# Patient Record
Sex: FEMALE | NOT HISPANIC OR LATINO
[De-identification: names, ages, dates, MRNs, and addresses within clinical notes are randomized per-mention and may not be internally consistent; named-entity substitution may affect disease eponyms.]

---

## 2019-06-18 ENCOUNTER — TELEPHONE (OUTPATIENT)
Dept: SCHEDULING | Age: 39
End: 2019-06-18

## 2019-06-27 PROCEDURE — 87205 SMEAR GRAM STAIN: CPT | Performed by: NURSE PRACTITIONER

## 2019-06-27 PROCEDURE — 87075 CULTR BACTERIA EXCEPT BLOOD: CPT | Performed by: NURSE PRACTITIONER

## 2019-06-27 PROCEDURE — 87070 CULTURE OTHR SPECIMN AEROBIC: CPT | Performed by: NURSE PRACTITIONER

## 2019-06-27 PROCEDURE — 87077 CULTURE AEROBIC IDENTIFY: CPT | Performed by: NURSE PRACTITIONER

## 2019-06-27 PROCEDURE — 87076 CULTURE ANAEROBE IDENT EACH: CPT | Performed by: NURSE PRACTITIONER

## 2019-07-01 ENCOUNTER — OFFICE VISIT (OUTPATIENT)
Dept: HEMATOLOGY/ONCOLOGY | Age: 39
End: 2019-07-01

## 2019-07-01 VITALS
WEIGHT: 130.51 LBS | HEART RATE: 93 BPM | TEMPERATURE: 98.7 F | BODY MASS INDEX: 23.12 KG/M2 | DIASTOLIC BLOOD PRESSURE: 82 MMHG | HEIGHT: 63 IN | SYSTOLIC BLOOD PRESSURE: 122 MMHG | RESPIRATION RATE: 14 BRPM

## 2019-07-01 DIAGNOSIS — R74.8 ELEVATED VITAMIN B12 LEVEL: Primary | ICD-10-CM

## 2019-07-01 PROCEDURE — 99204 OFFICE O/P NEW MOD 45 MIN: CPT | Performed by: INTERNAL MEDICINE

## 2019-07-01 ASSESSMENT — ENCOUNTER SYMPTOMS
VOICE CHANGE: 0
NERVOUS/ANXIOUS: 1
DIZZINESS: 0
HEADACHES: 0
CHOKING: 0
FACIAL ASYMMETRY: 0
DIARRHEA: 0
SPEECH DIFFICULTY: 0
VOMITING: 0
UNEXPECTED WEIGHT CHANGE: 0
SHORTNESS OF BREATH: 0
FACIAL SWELLING: 0
ABDOMINAL DISTENTION: 0
FATIGUE: 0
SEIZURES: 0
APNEA: 0
DIAPHORESIS: 0
STRIDOR: 0
CONSTIPATION: 0
TREMORS: 0
ACTIVITY CHANGE: 0
ADENOPATHY: 0
NAUSEA: 0
CHEST TIGHTNESS: 0
ANAL BLEEDING: 0
WHEEZING: 0
APPETITE CHANGE: 0
BRUISES/BLEEDS EASILY: 0
SORE THROAT: 0
CHILLS: 0
BACK PAIN: 0
BLOOD IN STOOL: 0
TROUBLE SWALLOWING: 0
RECTAL PAIN: 0
ABDOMINAL PAIN: 0
FEVER: 0
WEAKNESS: 0

## 2020-06-29 ENCOUNTER — OFFICE VISIT (OUTPATIENT)
Dept: RHEUMATOLOGY | Facility: CLINIC | Age: 40
End: 2020-06-29
Payer: COMMERCIAL

## 2020-06-29 VITALS
WEIGHT: 130.38 LBS | HEIGHT: 62 IN | HEART RATE: 68 BPM | DIASTOLIC BLOOD PRESSURE: 72 MMHG | BODY MASS INDEX: 23.99 KG/M2 | SYSTOLIC BLOOD PRESSURE: 110 MMHG

## 2020-06-29 DIAGNOSIS — M79.18 MYOFASCIAL PAIN SYNDROME: ICD-10-CM

## 2020-06-29 DIAGNOSIS — R76.8 POSITIVE ANA (ANTINUCLEAR ANTIBODY): Primary | ICD-10-CM

## 2020-06-29 PROCEDURE — 99244 OFF/OP CNSLTJ NEW/EST MOD 40: CPT | Performed by: INTERNAL MEDICINE

## 2020-06-29 RX ORDER — METAXALONE 800 MG/1
TABLET ORAL AS NEEDED
COMMUNITY
Start: 2020-03-10

## 2020-06-29 NOTE — PATIENT INSTRUCTIONS
You were seen today for the positive GAYATRI  Further blood work was done specific for lupus or Sjogren's that came back negative  Your symptoms are more consistent with fibromyalgia

## 2020-06-29 NOTE — PROGRESS NOTES
Gerry Doyle is a 46year old female who presents for No chief complaint on file. Murali Huerta    HPI:   CC: joint pain  Consult: referred by PCP Dr. Chitra Woods    This is a 47 yo F with hx of Celiac Disease (+TTG IgA and duodenal biopsies, follows with Dr. Elaine Hippo sinus 2008   • OTHER SURGICAL HISTORY      lithrotripsy 2012      Family History   Problem Relation Age of Onset   • Anxiety Brother    • Anxiety Maternal Grandmother    • Suicide History Paternal Grandmother       Social History:  Social History    [place] - She was found to have a positive GAYATRI of 1: 160 with negative DIANNA panel. She also had a negative RF, ESR, CRP and HLA-B27  - GAYATRI could also be positive due to her celiac disease.   She exhibits no symptoms of connective tissue disease or inflammatory arth